# Patient Record
(demographics unavailable — no encounter records)

---

## 2025-07-16 NOTE — ASSESSMENT
[FreeTextEntry1] : Patient presents with Functional Oral and Mild/Moderate Pharyngeal Stage Dysphagia. The Oral Stage is characterized by adequate oral containment, adequate chewing for solid, adequate bolus manipulation, adequate tongue motion for anterior to posterior transfer of the bolus for puree/solids; piecemeal transfer/deglutition for puree/solids/liquids; with adequate oral clearance post swallow.   The Pharyngeal Stage is characterized by delayed initiation of the pharyngeal swallow (Bolus head is at the vallecular for Thin Liquids), reduced laryngeal elevation with incomplete laryngeal vestibular closure, adequate tongue base retraction and adequate pharyngeal constriction. There is adequate pharyngeal clearance post swallow for puree/solids/liquids.   There was Trace Deep Laryngeal Penetration during the swallow for Thin Liquids via consecutive cup sip drinking to the level of the vocal folds. Patient is sensate to the Deep Penetration given a cough response to clear contrast from the upper airway.  There was No Aspiration observed before, during or after the swallow for puree, solids, mildly thick liquids. Compensatory strategy of small single cup sips of Thin Liquids eliminated Laryngeal Penetration with airway protection maintained.    RESULTS:  1.) No Aspiration observed before, during or after the swallow for puree, solids, mildly thick liquids 2.) Trace Deep Laryngeal Penetration during the swallow for Thin Liquids via consecutive cup sip drinking to the level of the vocal folds. 3.) Compensatory strategy of small single cup sips of Thin Liquids eliminated Laryngeal Penetration with airway protection maintained.   Of Note: Patient was given a Puree and Barium Tablet in Anterior Posterior view.  An Esophageal Screen was performed. The Barium Tablet was observed to course through the pharynx and esophagus without hold up. This cannot be considered as a full complete evaluation of the esophagus.   Of Note: Cervical Hardware on  view image consistent with surgical history of C-Spine (2006).

## 2025-07-16 NOTE — HISTORY OF PRESENT ILLNESS
[FreeTextEntry1] : Patient arrived to Radiology for an OutPatient Modified Barium Swallow Study. 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  Recommendations/Plan: 1.) Continue with Regular with Thin Liquids 2.) Feeding/Swallowing Guidelines: Sit upright, small bites, chew solids well, small single cup sips of thin liquids; two swallows per bite/sip; alternate food and liquid consistencies. 3.) Aspiration and Reflux Precautions 4.) Maintain Good Oral Hygiene Care 5.) Follow up with referring Physician 6.) Consider swallowing therapy to maximize swallow mechanism  Speech Pathology OutPatient Clinic Note: 8/3/2020 - Patient was seen for skilled ST session focusing on dysphagia remediation this PM. Patient also reported ongoing tolerance to regular solid diet and thin liquids via single cup sip with no overt coughing episodes during meals. Patient also reported ongoing adherence to HEP re: relaxation exercises and swallow strengthening exercises, however he described increased throat irritation 2-3 hours after exercises. To minimize tension in the head/neck, patient completed relaxation exercises 5x10 each with min-mod verbal cues to improve ROM. Patient completed the following swallow strengthening exercises: effortful swallow, Linda maneuver and Supraglottic swallow x10 each with good performance with min verbal and visual cues. As discussed with patient, swallow therapy deemed no longer warranted at this time. Patient was advised to continue regular solid diet with thin liquids via single cup sip. Patient denied throat pain/irritation upon completion of today's session, however he was advised to cease HEP if irritation persists. Additionally, patient was also encouraged to follow up with ENT as directed. Patient verbalized full understanding and agreement with all the above. Contact information to this department was provided should patient have any further questions/concerns.  Neuro Note 5/7/2025 - 75-year-old with lifelong personal and family history of tremor, status post anterior cervical decompression and fusion, status post procedures for micrognathia who presents with longstanding complaints of dysphagia and vague esophageal symptoms. He also complains of recent mild ataxia and tremor. I suspect that his symptoms are likely related to his hereditary tremor disorder. I am also concerned that his dysphagia might be related to esophageal impingement by anterior cervical hardware. I suggested that he undergo a [facility] genetics genome study to evaluate for a hereditary tremor and ataxia. He will undergo cervical x-rays with flexion and extension views. He will be referred for speech and swallowing evaluation with cine esophagram. He will retrieve his most recent MRIs of the brain and cervical spine for review. Further management will depend upon these results and his clinical course.  Today, Patient offers c/o "going down the wrong way" "more recently, at times coughing with food and liquid" for the past couple of years; "I have esophageal spasms" "I cut large tablets supplements."  No Hemilich. No current/repeated pneumonia. Patient eats Regular with Thin Liquids.  This repeat Modified Barium Swallow Study is to objectively assess the Physiology of the Oral and Pharyngeal Stage swallowing mechanism for treatment plan for least restrictive diet.   Of Note: Patient provided additional written information of medical history: "I had a botched neck lift surgery nineteen years ago that has left me in a steady state of discomfort and acute pain at times from an acute tightness through the whole submental area, from the apex of m y chin to the Talha's apple. 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  Most recent theory from 2 doctors consider possible impingement to the vagus nerve as there are other symptoms suggestive of this affliction. The After image is not of drooping skin, but a hard thick mass. These photos reflect the fact that the before looks like an after and the after looks like a before, the exact opposite of what is expected for a neck lift. Numerous consults with plastic surgeons, some with ENT backgrounds generated two of their strongest theories accounting for the condition. That being either massive scar tissue just above the Dalton' apple or highly stressed anterior digastrics, which in turn are stressing the Hyoid bone. (The anterior digastrics were severed and reattached at the interior chin surface in the original surgery). Two surgeries seeking to ameliorate the condition, trimmed out a few sutures in the mastoid area providing little relief. Recent consults with Cardiolgoist and Gastroenterologist referred me to a neurologist to explosure the vagus nerve theory."    Referring MD: Dr. Rafael Mccain

## 2025-07-16 NOTE — PLAN
[FreeTextEntry2] :  1.) Regular with Thin Liquids via small single cup sip 2.) Feeding/Swallowing Guidelines: Upright position, small bites, chew well, SMALL single cup sip of thin liquids. Avoid/No large/consecutive cup sip drinking 3.) Aspiration Precautions 4.) Reflux Precautions 5.) Maintain Good Oral Hygiene Care 6.) Follow up with Physician 7.) Swallow Therapy to maximize swallow mechanism  SLP provided Patient education regarding preliminary results. Patient verbalized understanding and will follow up with Physician.